# Patient Record
Sex: FEMALE | Race: WHITE | NOT HISPANIC OR LATINO | URBAN - NONMETROPOLITAN AREA
[De-identification: names, ages, dates, MRNs, and addresses within clinical notes are randomized per-mention and may not be internally consistent; named-entity substitution may affect disease eponyms.]

---

## 2018-03-05 ENCOUNTER — PROCEDURE VISIT (OUTPATIENT)
Dept: OTOLARYNGOLOGY | Facility: CLINIC | Age: 43
End: 2018-03-05

## 2018-03-05 ENCOUNTER — OFFICE VISIT (OUTPATIENT)
Dept: OTOLARYNGOLOGY | Facility: CLINIC | Age: 43
End: 2018-03-05

## 2018-03-05 VITALS
TEMPERATURE: 98 F | HEIGHT: 62 IN | SYSTOLIC BLOOD PRESSURE: 128 MMHG | WEIGHT: 222 LBS | DIASTOLIC BLOOD PRESSURE: 88 MMHG | HEART RATE: 76 BPM | BODY MASS INDEX: 40.85 KG/M2

## 2018-03-05 DIAGNOSIS — H69.83 DYSFUNCTION OF BOTH EUSTACHIAN TUBES: Primary | ICD-10-CM

## 2018-03-05 DIAGNOSIS — J34.89 NASAL SEPTAL SPUR: ICD-10-CM

## 2018-03-05 DIAGNOSIS — J30.9 ALLERGIC RHINITIS, UNSPECIFIED CHRONICITY, UNSPECIFIED SEASONALITY, UNSPECIFIED TRIGGER: ICD-10-CM

## 2018-03-05 PROCEDURE — 99203 OFFICE O/P NEW LOW 30 MIN: CPT | Performed by: NURSE PRACTITIONER

## 2018-03-05 RX ORDER — CETIRIZINE HYDROCHLORIDE 10 MG/1
TABLET ORAL
COMMUNITY
Start: 2017-04-24

## 2018-03-05 RX ORDER — OMEPRAZOLE 20 MG/1
CAPSULE, DELAYED RELEASE ORAL
COMMUNITY
Start: 2017-06-06

## 2018-03-05 RX ORDER — ARIPIPRAZOLE 5 MG/1
TABLET ORAL
COMMUNITY
Start: 2017-08-14

## 2018-03-05 RX ORDER — FLUTICASONE PROPIONATE 50 MCG
SPRAY, SUSPENSION (ML) NASAL
COMMUNITY
Start: 2017-07-31

## 2018-03-05 RX ORDER — AZELASTINE 1 MG/ML
2 SPRAY, METERED NASAL 2 TIMES DAILY
Qty: 30 ML | Refills: 5 | Status: SHIPPED | OUTPATIENT
Start: 2018-03-05

## 2018-03-05 RX ORDER — ERGOCALCIFEROL 1.25 MG/1
CAPSULE ORAL
COMMUNITY
Start: 2017-07-31

## 2018-03-05 RX ORDER — ESCITALOPRAM OXALATE 20 MG/1
TABLET ORAL
COMMUNITY
Start: 2017-04-24

## 2018-03-05 NOTE — PROGRESS NOTES
YOB: 1975  Location: [facility]  Location Address: [address] [place] [address], [facility], [address] [place] [address]  Location Phone: [phone]    Chief Complaint   Patient presents with   • Ear Problem       History of Present Illness  Emma Fountain is a 42 y.o. female.  Emma Fountain is here for evaluation of ENT complaints. The patient has had problems with ear pressure, popping and cracking of the ear, recurrent ear infections and dizziness  The symptoms are not localized to a particular location. The patient has had moderate symptoms. The symptoms have been present for the last several months The symptoms are aggravated by  no identifiable factors. The symptoms are improved by no identifiable factors.  She has been on Flonase.  She reports ear pressure.  Positive for allergies.  Denies grossly obvious sinus pressure.  Her daughter has a history of ear problems.  She denies otalgia, otorrhea.         Progress Notes  Encounter Date: 3/5/2018  Stephanie Montoya   Audiology      []Hide copied text                   Past Medical History:   Diagnosis Date   • Allergy    • Chronic otitis media    • Depression    • Dizziness    • GERD (gastroesophageal reflux disease)    • Migraine        Past Surgical History:   Procedure Laterality Date   • TUBAL ABDOMINAL LIGATION         Outpatient Prescriptions Marked as Taking for the 3/5/18 encounter (Office Visit) with HARLAN Brewster   Medication Sig Dispense Refill   • ARIPiprazole (ABILIFY) 5 MG tablet TAKE ONE TABLET BY MOUTH EVERY DAY     • cetirizine (zyrTEC) 10 MG tablet TAKE ONE TABLET BY MOUTH EVERY DAY     • escitalopram (LEXAPRO) 20 MG tablet TAKE ONE TABLET BY MOUTH EVERY DAY     • fluticasone (FLONASE) 50 MCG/ACT nasal spray into each nostril.     • omeprazole (priLOSEC) 20 MG capsule TAKE ONE CAPSULE BY MOUTH EVERY MORNING BEFORE BREAKFAST     • vitamin D (ERGOCALCIFEROL) 13109 units capsule capsule Take  by mouth.         Review of patient's allergies  indicates no known allergies.    Family History   Problem Relation Age of Onset   • Heart disease Other    • Depression Other        Social History     Social History   • Marital status:      Spouse name: N/A   • Number of children: N/A   • Years of education: N/A     Occupational History   • Not on file.     Social History Main Topics   • Smoking status: Never Smoker   • Smokeless tobacco: Never Used   • Alcohol use No   • Drug use: Defer   • Sexual activity: Not on file     Other Topics Concern   • Not on file     Social History Narrative       Review of Systems   Constitutional: Negative.    HENT:        SEE HPI   Eyes: Negative.    Respiratory: Negative.    Cardiovascular: Negative.    Gastrointestinal: Negative.    Endocrine: Negative.    Genitourinary: Negative.    Musculoskeletal: Negative.    Skin: Negative.    Allergic/Immunologic: Negative.    Neurological: Positive for dizziness.   Hematological: Negative.    Psychiatric/Behavioral: Negative.        Vitals:    03/05/18 1030   BP: 128/88   Pulse: 76   Temp: 98 °F (36.7 °C)       Body mass index is 40.6 kg/(m^2).    Objective     Physical Exam  CONSTITUTIONAL: well nourished, alert, oriented, in no acute distress     COMMUNICATION AND VOICE: able to communicate normally, normal voice quality    HEAD: normocephalic, no lesions, atraumatic, no tenderness, no masses     FACE: appearance normal, no lesions, no tenderness, no deformities, facial motion symmetric    SALIVARY GLANDS: parotid glands with no tenderness, no swelling, no masses, submandibular glands with normal size, nontender    EYES: ocular motility normal, eyelids normal, orbits normal, no proptosis, conjunctiva normal , pupils equal, round     EARS:  Hearing: response to conversational voice normal bilaterally   External Ears: auricles without lesions  Otoscopic: tympanic membrane appearance normal, no lesions, no perforation, normal mobility, no fluid    NOSE:  External Nose: structure  normal, no tenderness on palpation, no nasal discharge, no lesions, no evidence of trauma, nostrils patent   Intranasal Exam: nasal mucosa normal, vestibule within normal limits, inferior turbinate normal,left septal spur    ORAL:  Lips: upper and lower lips without lesion   Teeth: dentition within normal limits for age   Gums: gingivae healthy   Oral Mucosa: oral mucosa normal, no mucosal lesions   Floor of Mouth: Warthin’s duct patent, mucosa normal  Tongue: lingual mucosa normal without lesions, normal tongue mobility   Palate: soft and hard palates with normal mucosa and structure  Oropharynx: oropharyngeal mucosa normal    NECK: neck appearance normal, no mass,  noted without erythema or tenderness    THYROID: no overt thyromegaly, no tenderness, nodules or mass present on palpation, position midline     LYMPH NODES: no lymphadenopathy    CHEST/RESPIRATORY: respiratory effort normal    CARDIOVASCULAR:  extremities without cyanosis or edema      NEUROLOGIC/PSYCHIATRIC: oriented to time, place and person, mood normal, affect appropriate, CN II-XII intact grossly    Assessment/Plan   Emma was seen today for ear problem.    Diagnoses and all orders for this visit:    Dysfunction of both eustachian tubes    Allergic rhinitis, unspecified chronicity, unspecified seasonality, unspecified trigger    Nasal septal spur    Other orders  -     azelastine (ASTELIN) 0.1 % nasal spray; 2 sprays into each nostril 2 (Two) Times a Day. Use in each nostril as directed      * Surgery not found *  No orders of the defined types were placed in this encounter.    Return if symptoms worsen or fail to improve.       Patient Instructions   Call for problems or worsening symptoms    No surgical intervention needed

## 2018-08-14 ENCOUNTER — OFFICE VISIT (OUTPATIENT)
Dept: OTOLARYNGOLOGY | Facility: CLINIC | Age: 43
End: 2018-08-14

## 2018-08-14 VITALS
TEMPERATURE: 97.7 F | HEIGHT: 62 IN | SYSTOLIC BLOOD PRESSURE: 131 MMHG | WEIGHT: 217.38 LBS | HEART RATE: 86 BPM | DIASTOLIC BLOOD PRESSURE: 81 MMHG | BODY MASS INDEX: 40 KG/M2

## 2018-08-14 DIAGNOSIS — J34.89 CONCHA BULLOSA: ICD-10-CM

## 2018-08-14 DIAGNOSIS — J01.90 ACUTE SINUSITIS, RECURRENCE NOT SPECIFIED, UNSPECIFIED LOCATION: ICD-10-CM

## 2018-08-14 DIAGNOSIS — J34.3 HYPERTROPHY OF INFERIOR NASAL TURBINATE: ICD-10-CM

## 2018-08-14 DIAGNOSIS — R51.9 HEADACHE, UNSPECIFIED HEADACHE TYPE: ICD-10-CM

## 2018-08-14 DIAGNOSIS — J30.9 ALLERGIC RHINITIS, UNSPECIFIED CHRONICITY, UNSPECIFIED SEASONALITY, UNSPECIFIED TRIGGER: ICD-10-CM

## 2018-08-14 DIAGNOSIS — J34.2 DEVIATED NASAL SEPTUM: ICD-10-CM

## 2018-08-14 DIAGNOSIS — J32.9 CHRONIC SINUSITIS, UNSPECIFIED LOCATION: Primary | ICD-10-CM

## 2018-08-14 PROCEDURE — 70486 CT MAXILLOFACIAL W/O DYE: CPT | Performed by: NURSE PRACTITIONER

## 2018-08-14 PROCEDURE — 99214 OFFICE O/P EST MOD 30 MIN: CPT | Performed by: NURSE PRACTITIONER

## 2018-08-14 RX ORDER — METHYLPREDNISOLONE 4 MG/1
TABLET ORAL
Qty: 1 EACH | Refills: 0 | Status: SHIPPED | OUTPATIENT
Start: 2018-08-14 | End: 2018-08-20

## 2018-08-14 RX ORDER — AMOXICILLIN AND CLAVULANATE POTASSIUM 875; 125 MG/1; MG/1
1 TABLET, FILM COATED ORAL EVERY 12 HOURS SCHEDULED
Qty: 20 TABLET | Refills: 0 | Status: SHIPPED | OUTPATIENT
Start: 2018-08-14 | End: 2018-08-24

## 2018-08-14 NOTE — PATIENT INSTRUCTIONS
Avoidance of allergies discussed.  Use masks when performing yardwork or cleaning activities if indicated.  Continue allergy medications as discussed.    Air purifier as needed.  If on nasal sprays, recommend to use daily as indicated.   Medical vs surgical options discussed.    Call for problems or worsening symptoms    Medical vs surgical options discussed    Call for problems or worsening symptoms    Check allergy testing

## 2018-08-14 NOTE — PROGRESS NOTES
YOB: 1975  Location: [facility]  Location Address: [address] [place] [address], [facility], [address] [place] [address]  Location Phone: [phone]    Chief Complaint   Patient presents with   • Sinus Problem       History of Present Illness  Emma Fountain is a 42 y.o. female.  Emma Fountain is here for follow up of ENT complaints. The patient has had problems with ear fullness and ear pressure, sinonasal complaints, nasal congestion, repeated sinusitis, sinus pressure, allergy and headaches  The symptoms are not localized to a particular location. The patient has had worsening symptoms. The symptoms have been present for the last several months The symptoms are aggravated by  allergy. The symptoms are improved by no identifiable factors.  She has recently finishes Augmentin with continued symptoms.  She has cats.  Migraines have been worsening.    CT SINUSES reviewed: left maxillary partial opacification, right septal deviation, bilateral milind bullosa, right ethmoid partial opacification, right frontal chronic mucosal thickening     Past Medical History:   Diagnosis Date   • Allergic rhinitis    • Allergy    • Chronic otitis media    • Depression    • Dizziness    • Eustachian tube dysfunction    • GERD (gastroesophageal reflux disease)    • Migraine    • Nasal septal spur    • Nasal septal spur        Past Surgical History:   Procedure Laterality Date   • TUBAL ABDOMINAL LIGATION         Outpatient Prescriptions Marked as Taking for the 18 encounter (Office Visit) with Gillian Whittaker APRN   Medication Sig Dispense Refill   • ARIPiprazole (ABILIFY) 5 MG tablet TAKE ONE TABLET BY MOUTH EVERY DAY     • azelastine (ASTELIN) 0.1 % nasal spray 2 sprays into each nostril 2 (Two) Times a Day. Use in each nostril as directed 30 mL 5   • cetirizine (zyrTEC) 10 MG tablet TAKE ONE TABLET BY MOUTH EVERY DAY     • escitalopram (LEXAPRO) 20 MG tablet TAKE ONE TABLET BY MOUTH EVERY DAY     • fluticasone (FLONASE) 50  MCG/ACT nasal spray into each nostril.     • omeprazole (priLOSEC) 20 MG capsule TAKE ONE CAPSULE BY MOUTH EVERY MORNING BEFORE BREAKFAST     • vitamin D (ERGOCALCIFEROL) 26647 units capsule capsule Take  by mouth.         Patient has no known allergies.    Family History   Problem Relation Age of Onset   • Heart disease Other    • Depression Other        Social History     Social History   • Marital status:      Spouse name: N/A   • Number of children: N/A   • Years of education: N/A     Occupational History   • Not on file.     Social History Main Topics   • Smoking status: Never Smoker   • Smokeless tobacco: Never Used   • Alcohol use No   • Drug use: Unknown   • Sexual activity: Not on file     Other Topics Concern   • Not on file     Social History Narrative   • No narrative on file       Review of Systems   Constitutional: Negative.    HENT:        SEE HPI   Eyes: Negative.    Respiratory: Negative.    Cardiovascular: Negative.    Gastrointestinal: Negative.    Endocrine: Negative.    Genitourinary: Negative.    Musculoskeletal: Negative.    Skin: Negative.    Allergic/Immunologic: Positive for environmental allergies.   Neurological: Negative.    Hematological: Negative.    Psychiatric/Behavioral: Negative.        Vitals:    08/14/18 1436   BP: 131/81   Pulse: 86   Temp: 97.7 °F (36.5 °C)       Body mass index is 39.76 kg/m².    Objective     Physical Exam  CONSTITUTIONAL: well nourished, alert, oriented, in no acute distress     COMMUNICATION AND VOICE: able to communicate normally, normal voice quality    HEAD: normocephalic, no lesions, atraumatic, no tenderness, no masses     FACE: appearance normal, no lesions, no tenderness, no deformities, facial motion symmetric    SALIVARY GLANDS: parotid glands with no tenderness, no swelling, no masses, submandibular glands with normal size, nontender    EYES: ocular motility normal, eyelids normal, orbits normal, no proptosis, conjunctiva normal , pupils  equal, round     EARS:  Hearing: response to conversational voice normal bilaterally   External Ears: auricles without lesions  Otoscopic: tympanic membrane appearance normal, no lesions, no perforation, normal mobility, no fluid    NOSE:  External Nose: structure normal, no tenderness on palpation, no nasal discharge, no lesions, no evidence of trauma, nostrils patent   Intranasal Exam: nasal mucosa edema, vestibule within normal limits, inferior turbinate hypertrophy, nasal septum deviated to right    ORAL:  Lips: upper and lower lips without lesion   Teeth: dentition within normal limits for age   Gums: gingivae healthy   Oral Mucosa: oral mucosa normal, no mucosal lesions   Floor of Mouth: Warthin’s duct patent, mucosa normal  Tongue: lingual mucosa normal without lesions, normal tongue mobility   Palate: soft and hard palates with normal mucosa and structure  Oropharynx: oropharyngeal mucosa normal    NECK: neck appearance normal, no mass,  noted without erythema or tenderness    THYROID: no overt thyromegaly, no tenderness, nodules or mass present on palpation, position midline     LYMPH NODES: no lymphadenopathy    CHEST/RESPIRATORY: respiratory effort normal    CARDIOVASCULAR: extremities without cyanosis or edema      NEUROLOGIC/PSYCHIATRIC: oriented to time, place and person, mood normal, affect appropriate, CN II-XII intact grossly    Assessment/Plan   Emma was seen today for sinus problem.    Diagnoses and all orders for this visit:    Chronic sinusitis, unspecified location  -     CT Sinus Without Contrast    Deviated nasal septum    Hypertrophy of inferior nasal turbinate    Headache, unspecified headache type    Cynthia bullosa    Acute sinusitis, recurrence not specified, unspecified location    Other orders  -     amoxicillin-clavulanate (AUGMENTIN) 875-125 MG per tablet; Take 1 tablet by mouth Every 12 (Twelve) Hours for 10 days.  -     MethylPREDNISolone (MEDROL) 4 MG tablet; Take as directed on  package instructions.      * Surgery not found *  Orders Placed This Encounter   Procedures   • CT Sinus Without Contrast     Order Specific Question:   Patient Pregnant     Answer:   Unknown     Return in about 8 weeks (around 10/9/2018).       Patient Instructions   Avoidance of allergies discussed.  Use masks when performing yardwork or cleaning activities if indicated.  Continue allergy medications as discussed.    Air purifier as needed.  If on nasal sprays, recommend to use daily as indicated.   Medical vs surgical options discussed.    Call for problems or worsening symptoms    Medical vs surgical options discussed    Call for problems or worsening symptoms    Check allergy testing

## 2018-08-21 ENCOUNTER — PROCEDURE VISIT (OUTPATIENT)
Dept: OTOLARYNGOLOGY | Facility: CLINIC | Age: 43
End: 2018-08-21

## 2018-08-21 DIAGNOSIS — J30.9 ALLERGIC RHINITIS, UNSPECIFIED CHRONICITY, UNSPECIFIED SEASONALITY, UNSPECIFIED TRIGGER: Primary | ICD-10-CM

## 2018-08-21 PROCEDURE — 95024 IQ TESTS W/ALLERGENIC XTRCS: CPT | Performed by: NURSE PRACTITIONER

## 2018-08-21 PROCEDURE — 95004 PERQ TESTS W/ALRGNC XTRCS: CPT | Performed by: NURSE PRACTITIONER

## 2018-08-21 NOTE — PROGRESS NOTES
Allergy History Questionnaire  Emma Fountain 1975 8/21/2018  Occupation:     Symptoms  (Check which applies)  Severity? Frequency? When are they worse?   [] Mild [x] Constant [] Spring   [] Moderate [] Erratic [] Summer   [x] Severe [] Occasional [] Fall   [] Variable [] Seasonal [] Winter     [x] Year Round       Do they interfere with your life? Do they interfere with your sleep?   [] Note at all [x] Yes   [] A little [] No   [x] Moderately [x] If yes, please explain: nasal congestion with drainage. ___________________________________   [] Prevents normal activity        Please check the symptoms that apply to your allergy symptoms.  Nose Eyes Ears   [] Bleeding [] Infections [] Buzzing   [] Crusting [x] Itching [x] Dizziness   [x] Dryness [x] Redness [] Drainage   [x] Itching [x] Swollen Lids [x] Fullness   [x] Nasal Congestion [x] Watery [] Hearing Loss   [x] Nasal Drainage [] None [x] Itching   [] Nasal Polyps  [x] Pain   [] Septal Deviation  [x] Pressure   [x] Sneezing     [] None         Throat Mouth Chest   [x] Burning [] Burning [x] Asthma as a child   [x] Dryness [x] Dryness [] Asthma as an adult   [] Hoarseness [x] Itching [] Bronchitis   [] Laryngitis [x] Mouth Breathing [] Cough   [x] Snoring [] None [] Pneumonia   [] Sore Throats  [] Wheezing   [] Tonsillectomy  [] None   [] Vocal Cord Polyps     [] None           Skin   [] Dryness   [] Eczema   [] Hives   [x] Itching   [x] Rash   [] Swelling   [] None     Other Irritants: none    Environment    Inside Home? Smoking Habits?   [] Carpeting [] Cigarettes   [] Hardwood [] Cigars   [] Fireplace (Gas or Wood Burning) [] Pipe   [x] Laminate Floor [] Years Smoked   [] Plants [x] Stopped Smoking in 2011   [] Tile [] Exposed to Secondhand Smoke     Animals in the home? Near your home?   [] Bird(s) [] Barn   [x] Cat(s) x 3 indoors [x] Factory   [x] Dog(s) x 1 indoors [x] Fields   [] Fish [x] Trees   [] Other [x] Weeds    [] Water (creek,  lake, pond, river)     Heating your home? Cooling your home?   [] Electric [x] Central Air Unit   [x] Natural Gas [] Fan(s)   [] Oil [] Window Unit   [] Propane    [] Wood    [] Nino/Thermal      Any known allergic reactions to any of the following?   [x] Bees   [x] Mosquitoes   [x] Wasps                 Have you ever been allergy tested in the past?  No    Have you ever had to seek medical treatment in an Emergency Room due to an allergic reaction?  No

## 2018-08-29 ENCOUNTER — OFFICE VISIT (OUTPATIENT)
Dept: OTOLARYNGOLOGY | Facility: CLINIC | Age: 43
End: 2018-08-29

## 2018-08-29 VITALS
TEMPERATURE: 97.5 F | HEART RATE: 86 BPM | BODY MASS INDEX: 39.61 KG/M2 | WEIGHT: 215.25 LBS | DIASTOLIC BLOOD PRESSURE: 85 MMHG | HEIGHT: 62 IN | SYSTOLIC BLOOD PRESSURE: 140 MMHG

## 2018-08-29 DIAGNOSIS — J34.2 DEVIATED NASAL SEPTUM: ICD-10-CM

## 2018-08-29 DIAGNOSIS — J32.9 CHRONIC SINUSITIS, UNSPECIFIED LOCATION: ICD-10-CM

## 2018-08-29 DIAGNOSIS — J30.9 ALLERGIC RHINITIS, UNSPECIFIED CHRONICITY, UNSPECIFIED SEASONALITY, UNSPECIFIED TRIGGER: Primary | ICD-10-CM

## 2018-08-29 DIAGNOSIS — R51.9 HEADACHE, UNSPECIFIED HEADACHE TYPE: ICD-10-CM

## 2018-08-29 DIAGNOSIS — G43.809 OTHER MIGRAINE WITHOUT STATUS MIGRAINOSUS, NOT INTRACTABLE: ICD-10-CM

## 2018-08-29 PROCEDURE — 99214 OFFICE O/P EST MOD 30 MIN: CPT | Performed by: NURSE PRACTITIONER

## 2018-08-29 NOTE — PROGRESS NOTES
YOB: 1975  Location: [facility]  Location Address: [address] [place] [address], [facility], [address] [place] [address]  Location Phone: [phone]    Chief Complaint   Patient presents with   • allergy test results       History of Present Illness  Emma Fountain is a 42 y.o. female.  Emma Fountain is here for follow up of ENT complaints. The patient has had problems with nasal congestion, repeated sinusitis, sinus pressure and allergy  The symptoms are not localized to a particular location. The patient has had worsening symptoms. The symptoms have been present for the last several months The symptoms are aggravated by  allergy. The symptoms are improved by no identifiable factors.  She has cats and dogs in the home.  She is interested in sinus surgery.  She reports several sinus infections per year.  Here to discuss allergy testing.           Past Medical History:   Diagnosis Date   • Allergic rhinitis    • Allergy    • Chronic otitis media    • Chronic sinusitis    • Cynthia bullosa    • Depression    • Deviated nasal septum    • Dizziness    • Eustachian tube dysfunction    • GERD (gastroesophageal reflux disease)    • Hypertrophy of both inferior nasal turbinates    • Migraine    • Nasal septal spur        Past Surgical History:   Procedure Laterality Date   • TUBAL ABDOMINAL LIGATION         Outpatient Prescriptions Marked as Taking for the 18 encounter (Office Visit) with Gillian Whittaker APRN   Medication Sig Dispense Refill   • ARIPiprazole (ABILIFY) 5 MG tablet TAKE ONE TABLET BY MOUTH EVERY DAY     • azelastine (ASTELIN) 0.1 % nasal spray 2 sprays into each nostril 2 (Two) Times a Day. Use in each nostril as directed 30 mL 5   • cetirizine (zyrTEC) 10 MG tablet TAKE ONE TABLET BY MOUTH EVERY DAY     • escitalopram (LEXAPRO) 20 MG tablet TAKE ONE TABLET BY MOUTH EVERY DAY     • fluticasone (FLONASE) 50 MCG/ACT nasal spray into each nostril.     • omeprazole (priLOSEC) 20 MG capsule TAKE ONE CAPSULE  BY MOUTH EVERY MORNING BEFORE BREAKFAST     • vitamin D (ERGOCALCIFEROL) 86841 units capsule capsule Take  by mouth.         Patient has no known allergies.    Family History   Problem Relation Age of Onset   • Heart disease Other    • Depression Other        Social History     Social History   • Marital status:      Spouse name: N/A   • Number of children: N/A   • Years of education: N/A     Occupational History   • Not on file.     Social History Main Topics   • Smoking status: Never Smoker   • Smokeless tobacco: Never Used   • Alcohol use No   • Drug use: Unknown   • Sexual activity: Not on file     Other Topics Concern   • Not on file     Social History Narrative   • No narrative on file       Review of Systems   Constitutional: Negative.    HENT:        SEE HPI   Eyes: Negative.    Respiratory: Negative.    Cardiovascular: Negative.    Gastrointestinal: Negative.    Endocrine: Negative.    Genitourinary: Negative.    Musculoskeletal: Negative.    Skin: Negative.    Allergic/Immunologic: Positive for environmental allergies.   Neurological: Negative.    Hematological: Negative.    Psychiatric/Behavioral: Negative.        Vitals:    08/29/18 0939   BP: 140/85   Pulse: 86   Temp: 97.5 °F (36.4 °C)       Body mass index is 39.37 kg/m².    Objective     Physical Exam  CONSTITUTIONAL: well nourished, alert, oriented, in no acute distress     COMMUNICATION AND VOICE: able to communicate normally, normal voice quality    HEAD: normocephalic, no lesions, atraumatic, no tenderness, no masses     FACE: appearance normal, no lesions, no tenderness, no deformities, facial motion symmetric    EYES: ocular motility normal, eyelids normal, orbits normal, no proptosis, conjunctiva normal , pupils equal, round     EARS:  Hearing: response to conversational voice normal bilaterally   External Ears: auricles without lesions      NOSE:  External Nose: structure normal, no tenderness on palpation, no nasal discharge, no  lesions, no evidence of trauma, nostrils patent     ORAL:  Lips: upper and lower lips without lesion     LYMPH NODES: no lymphadenopathy    CHEST/RESPIRATORY: respiratory effort normal    CARDIOVASCULAR: r extremities without cyanosis or edema      NEUROLOGIC/PSYCHIATRIC: oriented to time, place and person, mood normal, affect appropriate, CN II-XII intact grossly    Assessment/Plan   Emma was seen today for allergy test results.    Diagnoses and all orders for this visit:    Allergic rhinitis, unspecified chronicity, unspecified seasonality, unspecified trigger    Chronic sinusitis, unspecified location    Deviated nasal septum    Headache, unspecified headache type    Other migraine without status migrainosus, not intractable      * Surgery not found *  No orders of the defined types were placed in this encounter.    Return in about 6 months (around 2/28/2019).       Patient Instructions   Medical vs surgical options discussed    Avoidance of allergies discussed.  Use masks when performing yardwork or cleaning activities if indicated.  Continue allergy medications as discussed.    Air purifier as needed.  If on nasal sprays, recommend to use daily as indicated.   Medical vs surgical options discussed.    Consider FESS  Discussed allergy shots, however, she [place] too far away

## 2018-08-29 NOTE — PATIENT INSTRUCTIONS
Medical vs surgical options discussed    Avoidance of allergies discussed.  Use masks when performing yardwork or cleaning activities if indicated.  Continue allergy medications as discussed.    Air purifier as needed.  If on nasal sprays, recommend to use daily as indicated.   Medical vs surgical options discussed.    Consider FESS  Discussed allergy shots, however, she lives too far away